# Patient Record
Sex: FEMALE | Race: BLACK OR AFRICAN AMERICAN | Employment: UNEMPLOYED | URBAN - METROPOLITAN AREA
[De-identification: names, ages, dates, MRNs, and addresses within clinical notes are randomized per-mention and may not be internally consistent; named-entity substitution may affect disease eponyms.]

---

## 2018-08-22 ENCOUNTER — HOSPITAL ENCOUNTER (EMERGENCY)
Age: 43
Discharge: HOME | End: 2018-08-22
Attending: EMERGENCY MEDICINE

## 2018-08-22 VITALS
WEIGHT: 186 LBS | TEMPERATURE: 98.8 F | OXYGEN SATURATION: 99 % | RESPIRATION RATE: 18 BRPM | SYSTOLIC BLOOD PRESSURE: 161 MMHG | HEART RATE: 66 BPM | DIASTOLIC BLOOD PRESSURE: 93 MMHG | BODY MASS INDEX: 32.96 KG/M2 | HEIGHT: 63 IN

## 2018-08-22 DIAGNOSIS — K04.7 DENTAL INFECTION: Primary | ICD-10-CM

## 2018-08-22 PROCEDURE — 99282 EMERGENCY DEPT VISIT SF MDM: CPT

## 2018-08-22 RX ORDER — PENICILLIN V POTASSIUM 500 MG/1
500 TABLET ORAL 4 TIMES DAILY
Qty: 28 TABLET | Refills: 0 | Status: SHIPPED | OUTPATIENT
Start: 2018-08-22 | End: 2018-08-29

## 2018-08-22 RX ORDER — IBUPROFEN 800 MG/1
800 TABLET ORAL EVERY 8 HOURS PRN
Qty: 120 TABLET | Refills: 0 | Status: SHIPPED | OUTPATIENT
Start: 2018-08-22

## 2018-08-22 ASSESSMENT — PAIN DESCRIPTION - LOCATION: LOCATION: MOUTH

## 2018-08-22 ASSESSMENT — PAIN DESCRIPTION - DIRECTION: RADIATING_TOWARDS: EAR

## 2018-08-22 ASSESSMENT — PAIN SCALES - GENERAL: PAINLEVEL_OUTOF10: 8

## 2018-08-22 NOTE — ED NOTES
Pt started with left sided tooth pain x2 days ago. Pt has hx of dental abscesses, only other medical hx is x2 pnemos. Pt has pain and pressure that radiates into her left ear. Pt also states she is unable to eat, drink or sleep due to pain. Pt has been taking ibuprofen. Pt has swelling to left side of jaw.        Namita Cardona  08/22/18 1011

## 2018-08-22 NOTE — ED PROVIDER NOTES
tenderness to palpation. No obvious periapical abscess. No area of fluctuance or active drainage at this time. Mild swelling to the area. No sublingual or submandibular swelling. No signs of buccal space abscess. No warmth or erythema noted. Uvula midline. Eyes: Pupils are equal, round, and reactive to light. Neck: Normal range of motion. Cardiovascular: Normal rate, regular rhythm and intact distal pulses. No murmur heard. Pulmonary/Chest: Effort normal. No stridor. No respiratory distress. Abdominal: Soft. She exhibits no distension. There is no tenderness. Musculoskeletal: Normal range of motion. Lymphadenopathy:     She has no cervical adenopathy. Neurological: She is alert and oriented to person, place, and time. Skin: Skin is warm and dry. No rash noted. She is not diaphoretic. No erythema. No pallor. Diagnostic Results     RADIOLOGY:  No orders to display       LABS:   No results found for this visit on 08/22/18. RECENT VITALS:  BP: (!) 161/93, Temp: 98.8 °F (37.1 °C), Pulse: 66, Resp: 18, SpO2: 99 %     Procedures       ED Course     Nursing Notes, Past Medical Hx, Past Surgical Hx, Social Hx, Allergies, and Family Hx were reviewed. The patient was given the following medications:  Orders Placed This Encounter   Medications    penicillin v potassium (VEETID) 500 MG tablet     Sig: Take 1 tablet by mouth 4 times daily for 7 days     Dispense:  28 tablet     Refill:  0    ibuprofen (ADVIL;MOTRIN) 800 MG tablet     Sig: Take 1 tablet by mouth every 8 hours as needed for Pain (Take with food)     Dispense:  120 tablet     Refill:  0       CONSULTS:  None    MEDICAL DECISION MAKING / ASSESSMENT / Graciela Ness is a 41 y.o. female presenting for evaluation of dental pain. Upon evaluation, patient has overall poor dentition but has symptoms concerning for dental infection to tooth #21.   There is no evidence of PTA or Neftaly angina, as patient does not have

## 2018-08-22 NOTE — ED NOTES
Patient discharged from [facility] ED. Discharge instructions given with verbal understanding from the patient. Patient instructed to contact PCP or return if condition should change or worsen.  Directed to outpatient pharmacy      Saúl Villegas RN  08/22/18 [id number]